# Patient Record
Sex: FEMALE | Race: WHITE | NOT HISPANIC OR LATINO | Employment: OTHER | URBAN - METROPOLITAN AREA
[De-identification: names, ages, dates, MRNs, and addresses within clinical notes are randomized per-mention and may not be internally consistent; named-entity substitution may affect disease eponyms.]

---

## 2024-02-21 ENCOUNTER — HOSPITAL ENCOUNTER (OUTPATIENT)
Dept: RADIOLOGY | Facility: CLINIC | Age: 64
Discharge: HOME | End: 2024-02-21
Payer: MEDICARE

## 2024-02-21 ENCOUNTER — OFFICE VISIT (OUTPATIENT)
Dept: ORTHOPEDIC SURGERY | Facility: CLINIC | Age: 64
End: 2024-02-21
Payer: MEDICARE

## 2024-02-21 DIAGNOSIS — M25.562 LEFT KNEE PAIN, UNSPECIFIED CHRONICITY: Primary | ICD-10-CM

## 2024-02-21 DIAGNOSIS — M25.562 LEFT KNEE PAIN, UNSPECIFIED CHRONICITY: ICD-10-CM

## 2024-02-21 PROCEDURE — 99213 OFFICE O/P EST LOW 20 MIN: CPT | Performed by: ORTHOPAEDIC SURGERY

## 2024-02-21 PROCEDURE — 73562 X-RAY EXAM OF KNEE 3: CPT | Mod: LT

## 2024-02-21 PROCEDURE — 73562 X-RAY EXAM OF KNEE 3: CPT | Mod: LEFT SIDE | Performed by: ORTHOPAEDIC SURGERY

## 2024-02-21 PROCEDURE — 99203 OFFICE O/P NEW LOW 30 MIN: CPT | Performed by: ORTHOPAEDIC SURGERY

## 2024-02-22 NOTE — PROGRESS NOTES
63 M female I have not seen in several years she has a history of a revision total knee replacement done 7 years ago on the left side.  She states that over the last 2 weeks she has had some soreness in her knee she states she started strength training with a  and she feels like that may have aggravated things she just wanted to make sure structurally everything was okay she denies any locking giving way denies any numbness or tingling denies any redness or swelling.    Location of pain: Anteromedial aspect knee left  Quality of pain: Aching soreness not severe but has not gone away  Modifying factors: Worse with weightbearing better with rest  Associated signs and symptoms: Denies any numbness denies any redness or drainage denies any bruising no locking or giving way  Previous treatment: She recently started an exercise program no other treatment        The patient's past medical history, family history, social history, and review of systems were documented on the patient's medical intake form.  The medical intake form was reviewed and scanned into the electronic medical record for future use.  History is otherwise negative except as stated in the HPI.    Physical exam    General: Alert and oriented to place, person, and time.  No acute distress and breathing comfortably; pleasant and cooperative with the examination.  HEENT: Head is normocephalic and atraumatic.  Neck: Supple, no visible swelling.  Cardiovascular: Good perfusion to the affected extremity.  Lungs: No audible wheezing or labored breathing.  Abdomen: Nondistended  HEME/Lymph : No visible abnormalities bilateral lower extremity    Extremity:  Knee incision is well-healed range of motion 0-1 15 no instability no signs of infection no significant swelling brisk cap refill compartments are soft calf is nontender    Diagnostics:      XR knee left 3 views    Result Date: 2/21/2024  Interpreted By:  Charles Leonardo, STUDY: XR KNEE LEFT 3  VIEWS;  ;  2/21/2024 1:58 pm   INDICATION: Signs/Symptoms:pain.   ACCESSION NUMBER(S): [id number]   ORDERING CLINICIAN: YUNI LEONARDO   FINDINGS: Left knee three views. Status post revision total knee replacement components in good position no signs of fracture dislocation or other bony abnormality     Signed by: Yuni Leonardo 2/21/2024 2:04 PM Dictation workstation:   [id number]         Procedures  [none   ]    Assessment:  Tendinitis of the left knee status post left revision total knee replacement    Treatment plan:  1.  The natural history of the condition and its associated treatment alternatives including surgical and nonsurgical options were discussed with the patient at length.  2.  Patient mainly wanted reassurance that everything structurally and was okay with her revision knee as she has changed her exercise program and starting to have some discomfort I reassured her everything looks okay as far as her clinical exam and her x-rays she has been on obtain an over-the-counter knee sleeve she is can work more on stretching take anti-inflammatory medication as needed and follow-up with me in a month if it is continuing to give her trouble.  3. [   ]  4.  All of the patient's questions were answered.    No orders of the defined types were placed in this encounter.      This note was prepared using voice recognition software.  The details of this note are correct and have been reviewed, and corrected to the best of my ability.  Some grammatical areas may persist related to the Dragon software    Yuni Leonardo MD  Senior Attending Physician  [facility]  [facility] [place]    [phone]

## 2024-03-26 ENCOUNTER — APPOINTMENT (OUTPATIENT)
Dept: ORTHOPEDIC SURGERY | Facility: CLINIC | Age: 64
End: 2024-03-26
Payer: MEDICARE